# Patient Record
Sex: FEMALE | Race: WHITE | HISPANIC OR LATINO | RURAL
[De-identification: names, ages, dates, MRNs, and addresses within clinical notes are randomized per-mention and may not be internally consistent; named-entity substitution may affect disease eponyms.]

---

## 2024-02-13 ENCOUNTER — APPOINTMENT (RX ONLY)
Dept: RURAL CLINIC 2 | Facility: CLINIC | Age: 69
Setting detail: DERMATOLOGY
End: 2024-02-13

## 2024-02-13 DIAGNOSIS — L30.9 DERMATITIS, UNSPECIFIED: ICD-10-CM

## 2024-02-13 PROCEDURE — ? TREATMENT REGIMEN

## 2024-02-13 PROCEDURE — ? COUNSELING

## 2024-02-13 PROCEDURE — 99205 OFFICE O/P NEW HI 60 MIN: CPT

## 2024-02-13 PROCEDURE — ? ADDITIONAL NOTES

## 2024-02-13 PROCEDURE — ? EDUCATIONAL RESOURCES PROVIDED

## 2024-02-13 ASSESSMENT — LOCATION DETAILED DESCRIPTION DERM
LOCATION DETAILED: RIGHT SUPERIOR MEDIAL UPPER BACK
LOCATION DETAILED: LEFT SUPERIOR PARIETAL SCALP

## 2024-02-13 ASSESSMENT — ITCH NUMERIC RATING SCALE: HOW SEVERE IS YOUR ITCHING?: 10

## 2024-02-13 ASSESSMENT — LOCATION ZONE DERM
LOCATION ZONE: SCALP
LOCATION ZONE: TRUNK

## 2024-02-13 ASSESSMENT — LOCATION SIMPLE DESCRIPTION DERM
LOCATION SIMPLE: SCALP
LOCATION SIMPLE: RIGHT UPPER BACK

## 2024-02-13 NOTE — PROCEDURE: COUNSELING
Patient Specific Counseling (Will Not Stick From Patient To Patient): Discussed with patient her rash history, including pathology from [facility] in  and . Discussed True Test patch testing. Discussed the products that she uses (she brings a product today that contains flower extracts). Discussed adverse effects of overuse of topical steroids. Discussed that patient's treatment options include - either alone or in combination - the followin) switching over to the Vanicream product line for cleansers, moisturizers, and conditioner (patient already uses Vanicream shampoo), 2) punch biopsy, and 3) do more extensive patch testing with the [employer]. As patient does not have an active rash on her scalp today and because she reports her scalp itching has lessened (the itch of 10/10 was when itching was at its worst), recommend patient switch to the Vanicream line and increase moisturization while concurrently tapering off hydroxyzine and fluocinonide. Patient is agreeable to recommendations. Discussed with patient that I realize she is here for a second opinion and she is welcome to return to [facility] for continued care. Patient states she would like to transfer her care to this clinic. I will see her back in one month for further evaluation. Patient requests a full skin check at that visit.
Detail Level: Zone

## 2024-02-13 NOTE — HPI: RASH
Is The Patient Presenting As Previously Scheduled?: Yes
How Severe Is Your Rash?: severe
Is This A New Presentation, Or A Follow-Up?: Rash
Additional History: Hydroxyzine 10 mg at night she is here for a second opinion she has had a biopsy at [facility]. She also had patch testing done. Mother has psoriasis.

## 2024-02-13 NOTE — PROCEDURE: ADDITIONAL NOTES
Detail Level: Detailed
Render Risk Assessment In Note?: no
Additional Notes: Chart notes from [facility] were reviewed again after patient's exam and the historical summary was created.

## 2024-02-13 NOTE — PROCEDURE: TREATMENT REGIMEN
Otc Regimen: Start Vanicream products for cleaning and moisturizing. Daily moisturization with a bland cream
Modify Regimen: Reduce fluocinonide from every evening to qohs x 1 week, hen to twice weekly. Reduce hydroxyzine from 1 tablet PO every evening to 1/2 tablet PO every evening x 1 week, then 1/4 tablet PO every evening
Discontinue Regimen: All other products patient is using on her hair and skin
Detail Level: Zone
Plan: Recheck in 1 month
Samples Given: VANICREAM:  Shampoo, Conditioner, Gentle Body Wash, Gentle Facial Cleanser

## 2024-03-13 ENCOUNTER — APPOINTMENT (RX ONLY)
Dept: RURAL CLINIC 2 | Facility: CLINIC | Age: 69
Setting detail: DERMATOLOGY
End: 2024-03-13

## 2024-03-13 DIAGNOSIS — L30.9 DERMATITIS, UNSPECIFIED: ICD-10-CM

## 2024-03-13 DIAGNOSIS — D18.0 HEMANGIOMA: ICD-10-CM

## 2024-03-13 DIAGNOSIS — L81.4 OTHER MELANIN HYPERPIGMENTATION: ICD-10-CM
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
## 2024-03-13 DIAGNOSIS — D22 MELANOCYTIC NEVI: ICD-10-CM

## 2024-03-13 DIAGNOSIS — Z71.89 OTHER SPECIFIED COUNSELING: ICD-10-CM

## 2024-03-13 DIAGNOSIS — L82.1 OTHER SEBORRHEIC KERATOSIS: ICD-10-CM

## 2024-03-13 DIAGNOSIS — L23.0 ALLERGIC CONTACT DERMATITIS DUE TO METALS: ICD-10-CM

## 2024-03-13 PROBLEM — D22.61 MELANOCYTIC NEVI OF RIGHT UPPER LIMB, INCLUDING SHOULDER: Status: ACTIVE | Noted: 2024-03-13

## 2024-03-13 PROBLEM — D18.01 HEMANGIOMA OF SKIN AND SUBCUTANEOUS TISSUE: Status: ACTIVE | Noted: 2024-03-13
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
## 2024-03-13 PROCEDURE — ? EDUCATIONAL RESOURCES PROVIDED

## 2024-03-13 PROCEDURE — ? COUNSELING

## 2024-03-13 PROCEDURE — ? TREATMENT REGIMEN

## 2024-03-13 PROCEDURE — 99213 OFFICE O/P EST LOW 20 MIN: CPT

## 2024-03-13 PROCEDURE — ? OTHER

## 2024-03-13 ASSESSMENT — LOCATION ZONE DERM
LOCATION ZONE: SCALP
LOCATION ZONE: LEG
LOCATION ZONE: TRUNK
LOCATION ZONE: ARM

## 2024-03-13 ASSESSMENT — LOCATION SIMPLE DESCRIPTION DERM
LOCATION SIMPLE: LEFT FOREARM
LOCATION SIMPLE: RIGHT UPPER BACK
LOCATION SIMPLE: UPPER BACK
LOCATION SIMPLE: ABDOMEN
LOCATION SIMPLE: RIGHT FOREARM
LOCATION SIMPLE: CHEST
LOCATION SIMPLE: LEFT THIGH
LOCATION SIMPLE: SCALP

## 2024-03-13 ASSESSMENT — LOCATION DETAILED DESCRIPTION DERM
LOCATION DETAILED: LEFT SUPERIOR PARIETAL SCALP
LOCATION DETAILED: LEFT ANTERIOR DISTAL THIGH
LOCATION DETAILED: LEFT DISTAL DORSAL FOREARM
LOCATION DETAILED: UPPER STERNUM
LOCATION DETAILED: RIGHT LATERAL ABDOMEN
LOCATION DETAILED: SUPERIOR THORACIC SPINE
LOCATION DETAILED: RIGHT PROXIMAL DORSAL FOREARM
LOCATION DETAILED: RIGHT INFERIOR UPPER BACK
LOCATION DETAILED: RIGHT SUPERIOR MEDIAL UPPER BACK

## 2024-03-13 NOTE — PROCEDURE: COUNSELING
Detail Level: Generalized
Detail Level: Zone
Detail Level: Detailed
Patient Specific Counseling (Will Not Stick From Patient To Patient): This is not controlled and patient seems to be flaring on her upper back and scalp. Discussed biopsy and patch testing. However, patient continues to use fluocinonide on both these areas daily. She was informed that patch testing cannot be done when her back is flared. She was also informed that a biopsy is best on skin that has not been treated recently with steroids (either topical or oral). She was instructed to taper fluocinonide as in the treatment regimen below. Patient also instructed to use hypoallergenic laundry detergent and dryer sheets. She was instructed to double-rinse her clothes and sheets. She was instructed to stop her natural hand cleansers and only use Vanicream cleansers. Recommend she consider stopping coloring her hair. Additionally, she was instructed to stop the [employer] cream she just purchased and was reminded to only use Vanicream products.

## 2024-03-13 NOTE — PROCEDURE: TREATMENT REGIMEN
Otc Regimen: Instructions as written in counseling above
Modify Regimen: Reduce fluocinonide from daily to every other day x2 weeks, then 3x week x 2 weeks, then use twice weekly. Patient given written instructions (photo taken)
Discontinue Regimen: All non-Vanicream products patient is using on her hair and skin
Detail Level: Zone
Plan: Recheck in 2 months. If no improvement, recommend punch biopsy. Patient instructed to return to clinic sooner if rash flares

## 2024-03-13 NOTE — PROCEDURE: OTHER
Render Risk Assessment In Note?: no
Note Text (......Xxx Chief Complaint.): This diagnosis correlates with the
Detail Level: Detailed
Other (Free Text): Used a tanning bed in her 30’s.

## 2024-04-23 ENCOUNTER — RX ONLY (OUTPATIENT)
Age: 69
Setting detail: RX ONLY
End: 2024-04-23

## 2024-04-23 RX ORDER — FLUOCINONIDE 0.5 MG/ML
SOLUTION TOPICAL
Qty: 60 | Refills: 0 | Status: ERX | COMMUNITY
Start: 2024-04-23

## 2024-05-14 ENCOUNTER — APPOINTMENT (RX ONLY)
Dept: RURAL CLINIC 2 | Facility: CLINIC | Age: 69
Setting detail: DERMATOLOGY
End: 2024-05-14

## 2024-05-14 DIAGNOSIS — L23.0 ALLERGIC CONTACT DERMATITIS DUE TO METALS: ICD-10-CM

## 2024-05-14 DIAGNOSIS — L30.9 DERMATITIS, UNSPECIFIED: ICD-10-CM

## 2024-05-14 PROCEDURE — ? COUNSELING

## 2024-05-14 PROCEDURE — 99212 OFFICE O/P EST SF 10 MIN: CPT

## 2024-05-14 ASSESSMENT — LOCATION SIMPLE DESCRIPTION DERM
LOCATION SIMPLE: LEFT FOREARM
LOCATION SIMPLE: RIGHT UPPER BACK
LOCATION SIMPLE: SCALP

## 2024-05-14 ASSESSMENT — LOCATION ZONE DERM
LOCATION ZONE: TRUNK
LOCATION ZONE: SCALP
LOCATION ZONE: ARM

## 2024-05-14 ASSESSMENT — LOCATION DETAILED DESCRIPTION DERM
LOCATION DETAILED: RIGHT SUPERIOR MEDIAL UPPER BACK
LOCATION DETAILED: LEFT SUPERIOR PARIETAL SCALP
LOCATION DETAILED: LEFT DISTAL DORSAL FOREARM

## 2024-05-14 NOTE — PROCEDURE: COUNSELING
Detail Level: Detailed
Patient Specific Counseling (Will Not Stick From Patient To Patient): This is resolved. Patient coated the back of her watch with a clear fingernail polish.
Patient Specific Counseling (Will Not Stick From Patient To Patient): This is currently not active. Patient instructed to continue Vanicream products (with the exception of  Eucerin cream which she likes and does not seem to react to). If rash recurs, she should return to clinic. She may continue flucinonide solution 1-2x week prn (one refill was sent in on April 23, 2024).
Detail Level: Zone

## 2024-07-10 ENCOUNTER — RX ONLY (OUTPATIENT)
Age: 69
Setting detail: RX ONLY
End: 2024-07-10

## 2024-07-10 RX ORDER — FLUOCINONIDE 0.5 MG/ML
SOLUTION TOPICAL
Qty: 60 | Refills: 0 | Status: ERX

## 2024-09-03 ENCOUNTER — RX ONLY (OUTPATIENT)
Age: 69
Setting detail: RX ONLY
End: 2024-09-03

## 2024-09-03 RX ORDER — FLUOCINONIDE 0.5 MG/ML
SOLUTION TOPICAL
Qty: 60 | Refills: 0 | Status: ERX

## 2024-11-08 ENCOUNTER — RX ONLY (OUTPATIENT)
Age: 69
Setting detail: RX ONLY
End: 2024-11-08

## 2024-11-08 RX ORDER — FLUOCINONIDE 0.5 MG/ML
SOLUTION TOPICAL
Qty: 60 | Refills: 0 | Status: ERX

## 2025-01-15 ENCOUNTER — RX ONLY (RX ONLY)
Age: 70
End: 2025-01-15

## 2025-01-15 RX ORDER — FLUOCINONIDE 0.5 MG/ML
SOLUTION TOPICAL
Qty: 60 | Refills: 0 | Status: ERX

## 2025-03-10 ENCOUNTER — RX ONLY (RX ONLY)
Age: 70
End: 2025-03-10

## 2025-03-10 RX ORDER — FLUOCINONIDE 0.5 MG/ML
SOLUTION TOPICAL
Qty: 60 | Refills: 0 | Status: CANCELLED

## 2025-03-12 ENCOUNTER — APPOINTMENT (OUTPATIENT)
Dept: RURAL CLINIC 2 | Facility: CLINIC | Age: 70
Setting detail: DERMATOLOGY
End: 2025-03-12

## 2025-03-12 DIAGNOSIS — L30.9 DERMATITIS, UNSPECIFIED: ICD-10-CM

## 2025-03-12 PROCEDURE — ? COUNSELING

## 2025-03-12 PROCEDURE — ? PRESCRIPTION

## 2025-03-12 PROCEDURE — 99214 OFFICE O/P EST MOD 30 MIN: CPT

## 2025-03-12 PROCEDURE — ? OTC TREATMENT REGIMEN

## 2025-03-12 PROCEDURE — ? PRESCRIPTION MEDICATION MANAGEMENT

## 2025-03-12 RX ORDER — FLUOCINONIDE 0.5 MG/ML
SOLUTION TOPICAL
Qty: 60 | Refills: 1 | Status: ERX

## 2025-03-12 ASSESSMENT — LOCATION DETAILED DESCRIPTION DERM
LOCATION DETAILED: RIGHT CENTRAL FRONTAL SCALP
LOCATION DETAILED: LEFT SUPERIOR PARIETAL SCALP
LOCATION DETAILED: RIGHT SUPERIOR MEDIAL UPPER BACK
LOCATION DETAILED: UPPER STERNUM

## 2025-03-12 ASSESSMENT — LOCATION ZONE DERM
LOCATION ZONE: TRUNK
LOCATION ZONE: SCALP

## 2025-03-12 ASSESSMENT — LOCATION SIMPLE DESCRIPTION DERM
LOCATION SIMPLE: RIGHT UPPER BACK
LOCATION SIMPLE: CHEST
LOCATION SIMPLE: SCALP

## 2025-03-12 NOTE — PROCEDURE: COUNSELING
Patient Specific Counseling (Will Not Stick From Patient To Patient): Patient's rash is flaring. Patient instructed to follow the prescription medication management and over the counter treatment regimen below.
Detail Level: Zone

## 2025-03-12 NOTE — PROCEDURE: PRESCRIPTION MEDICATION MANAGEMENT
Initiate Treatment: Fluocinide as prescribed
Render In Strict Bullet Format?: No
Detail Level: Simple

## 2025-03-12 NOTE — PROCEDURE: OTC TREATMENT REGIMEN
Patient Specific Otc Recommendations (Will Not Stick From Patient To Patient): Non-drowzy antihistamine qam and if still itchy use Benadryl at night prn. Switch back to ALL Free & Clear, double-rinse laundry. Start using Vanicream shampoo until her hair has grown out (no longer coloring hair) then okay go back to using Everyday shampoo. If rash recurs, discontinue Evetyday.
Detail Level: Zone

## 2025-06-10 ENCOUNTER — RX ONLY (RX ONLY)
Age: 70
End: 2025-06-10

## 2025-06-10 RX ORDER — FLUOCINONIDE 0.5 MG/ML
SOLUTION TOPICAL
Qty: 60 | Refills: 0 | Status: ERX